# Patient Record
Sex: MALE | Race: WHITE | NOT HISPANIC OR LATINO | URBAN - METROPOLITAN AREA
[De-identification: names, ages, dates, MRNs, and addresses within clinical notes are randomized per-mention and may not be internally consistent; named-entity substitution may affect disease eponyms.]

---

## 2020-06-26 ENCOUNTER — OFFICE VISIT (OUTPATIENT)
Dept: URBAN - METROPOLITAN AREA SURGERY CENTER 25 | Facility: SURGERY CENTER | Age: 75
End: 2020-06-26

## 2020-06-26 ENCOUNTER — CLAIMS CREATED FROM THE CLAIM WINDOW (OUTPATIENT)
Dept: URBAN - METROPOLITAN AREA CLINIC 4 | Facility: CLINIC | Age: 75
End: 2020-06-26
Payer: MEDICARE

## 2020-06-26 DIAGNOSIS — D12.4 BENIGN NEOPLASM OF DESCENDING COLON: ICD-10-CM

## 2020-06-26 DIAGNOSIS — D12.8 BENIGN NEOPLASM OF RECTUM: ICD-10-CM

## 2020-06-26 PROCEDURE — 88305 TISSUE EXAM BY PATHOLOGIST: CPT | Performed by: PATHOLOGY

## 2020-07-23 ENCOUNTER — OFFICE VISIT (OUTPATIENT)
Dept: URBAN - METROPOLITAN AREA CLINIC 113 | Facility: CLINIC | Age: 75
End: 2020-07-23

## 2020-07-25 ENCOUNTER — TELEPHONE ENCOUNTER (OUTPATIENT)
Dept: URBAN - METROPOLITAN AREA CLINIC 13 | Facility: CLINIC | Age: 75
End: 2020-07-25

## 2020-07-25 RX ORDER — POLYETHYLENE GLYCOL 3350, SODIUM CHLORIDE, SODIUM BICARBONATE AND POTASSIUM CHLORIDE WITH LEMON FLAVOR 420; 11.2; 5.72; 1.48 G/4L; G/4L; G/4L; G/4L
TAKE  ML AS DIRECTED MIX CONTENTS PER DIRECTIONS, BEGIN DRINKING 1/2 SOLUTION @ 5P, COMPLETE REMAINDER OF SOLUTION 6HR PRIOR TO PROCEDURE POWDER, FOR SOLUTION ORAL
Qty: 1 | Refills: 0 | OUTPATIENT
Start: 2017-04-05 | End: 2017-04-19

## 2020-07-25 RX ORDER — POLYETHYLENE GLYCOL 3350, SODIUM CHLORIDE, SODIUM BICARBONATE AND POTASSIUM CHLORIDE WITH LEMON FLAVOR 420; 11.2; 5.72; 1.48 G/4L; G/4L; G/4L; G/4L
TAKE 1/2 GALLON AT 5:00 PM DAY BEFORE PROCEDURE, TAKE SECOND 1/2 OF GALLON 6 HRS PRIOR TO PROCEDURE POWDER, FOR SOLUTION ORAL
Qty: 1 | Refills: 0 | OUTPATIENT
Start: 2020-06-23 | End: 2020-06-26

## 2020-07-25 RX ORDER — OMEGA-3/DHA/EPA/FISH OIL 1200 MG
TAKE 1 CAPSULE DAILY CAPSULE ORAL
Refills: 0 | OUTPATIENT
End: 2017-05-31

## 2020-07-26 ENCOUNTER — TELEPHONE ENCOUNTER (OUTPATIENT)
Dept: URBAN - METROPOLITAN AREA CLINIC 13 | Facility: CLINIC | Age: 75
End: 2020-07-26

## 2020-07-26 RX ORDER — EZETIMIBE 10 MG/1
TAKE 1 TABLET AT BEDTIME TABLET ORAL
Refills: 0 | Status: ACTIVE | COMMUNITY

## 2020-07-26 RX ORDER — TERBINAFINE HYDROCHLORIDE 250 MG/1
TAKE 1 TABLET DAILY AS DIRECTED TABLET ORAL
Refills: 0 | Status: ACTIVE | COMMUNITY

## 2020-07-26 RX ORDER — HYDROCORTISONE 25 MG/G
APPLY SPARINGLY TO AFFECTED AREA(S) TWICE DAILY CREAM TOPICAL
Qty: 1 | Refills: 1 | Status: ACTIVE | COMMUNITY
Start: 2017-07-27

## 2020-07-26 RX ORDER — WHEAT DEXTRIN 3 G/4 G
TAKE 2 TSP DAILY IN EIGHT OUNCES OF ANY NON-DAIRY BEVERAGE OR MIXED WITH SOFT FOOD. DOSAGE UNKNOWN POWDER (GRAM) ORAL
Refills: 0 | Status: ACTIVE | COMMUNITY

## 2020-07-26 RX ORDER — BACILLUS COAGULANS/INULIN 1B-250 MG
TAKE 2 CAPSULE EVERY OTHER DAY 6 BILLION CFU'S CAPSULE ORAL
Refills: 0 | Status: ACTIVE | COMMUNITY

## 2020-07-26 RX ORDER — ASPIRIN 81 MG
TAKE 1 TABLET DAILY AS DIRECTED TABLET, DELAYED RELEASE (ENTERIC COATED) ORAL
Refills: 0 | Status: ACTIVE | COMMUNITY

## 2020-07-26 RX ORDER — OXYCODONE HYDROCHLORIDE 5 MG/1
TK 1 T PO  Q 4 H PRF MILD PAIN TABLET ORAL
Qty: 15 | Refills: 0 | Status: ACTIVE | COMMUNITY
Start: 2019-09-25

## 2020-07-26 RX ORDER — DOCUSATE SODIUM 100 MG
TAKE 1 TABLET EVERY OTHER DAY TABLET ORAL
Refills: 0 | Status: ACTIVE | COMMUNITY

## 2020-07-26 RX ORDER — LATANOPROST 0.005 %
INSTILL 1 DROP TWICE DAILY DROPS OPHTHALMIC (EYE)
Refills: 0 | Status: ACTIVE | COMMUNITY

## 2020-07-26 RX ORDER — DORZOLAMIDE HCL/PF 2 %
INSTILL 1 DROP INTO BOTH EYES 2 TIMES DAILY. UNKNOWN DOSAGE DROPS OPHTHALMIC (EYE)
Refills: 0 | Status: ACTIVE | COMMUNITY

## 2021-06-30 ENCOUNTER — TELEPHONE ENCOUNTER (OUTPATIENT)
Dept: URBAN - METROPOLITAN AREA CLINIC 113 | Facility: CLINIC | Age: 76
End: 2021-06-30

## 2021-07-16 ENCOUNTER — TELEPHONE ENCOUNTER (OUTPATIENT)
Dept: URBAN - METROPOLITAN AREA CLINIC 113 | Facility: CLINIC | Age: 76
End: 2021-07-16

## 2021-10-15 ENCOUNTER — TELEPHONE ENCOUNTER (OUTPATIENT)
Dept: URBAN - METROPOLITAN AREA CLINIC 113 | Facility: CLINIC | Age: 76
End: 2021-10-15

## 2023-03-30 ENCOUNTER — TELEPHONE ENCOUNTER (OUTPATIENT)
Dept: URBAN - METROPOLITAN AREA CLINIC 107 | Facility: CLINIC | Age: 78
End: 2023-03-30

## 2023-04-10 ENCOUNTER — OFFICE VISIT (OUTPATIENT)
Dept: URBAN - METROPOLITAN AREA CLINIC 107 | Facility: CLINIC | Age: 78
End: 2023-04-10
Payer: MEDICARE

## 2023-04-10 ENCOUNTER — DASHBOARD ENCOUNTERS (OUTPATIENT)
Age: 78
End: 2023-04-10

## 2023-04-10 VITALS
WEIGHT: 186 LBS | DIASTOLIC BLOOD PRESSURE: 84 MMHG | HEART RATE: 65 BPM | TEMPERATURE: 98.3 F | BODY MASS INDEX: 27.55 KG/M2 | HEIGHT: 69 IN | SYSTOLIC BLOOD PRESSURE: 140 MMHG | RESPIRATION RATE: 20 BRPM

## 2023-04-10 DIAGNOSIS — K76.0 HEPATIC STEATOSIS: ICD-10-CM

## 2023-04-10 DIAGNOSIS — R59.1 LYMPHADENOPATHY: ICD-10-CM

## 2023-04-10 DIAGNOSIS — D12.6 ADENOMATOUS COLON POLYP: ICD-10-CM

## 2023-04-10 PROCEDURE — 99214 OFFICE O/P EST MOD 30 MIN: CPT | Performed by: INTERNAL MEDICINE

## 2023-04-10 RX ORDER — WHEAT DEXTRIN 3 G/4 G
TAKE 2 TSP DAILY IN EIGHT OUNCES OF ANY NON-DAIRY BEVERAGE OR MIXED WITH SOFT FOOD. DOSAGE UNKNOWN POWDER (GRAM) ORAL
Refills: 0 | Status: ACTIVE | COMMUNITY

## 2023-04-10 RX ORDER — DORZOLAMIDE HCL/PF 2 %
INSTILL 1 DROP INTO BOTH EYES 2 TIMES DAILY. UNKNOWN DOSAGE DROPS OPHTHALMIC (EYE)
Refills: 0 | Status: ACTIVE | COMMUNITY

## 2023-04-10 RX ORDER — OXYCODONE HYDROCHLORIDE 5 MG/1
TK 1 T PO  Q 4 H PRF MILD PAIN TABLET ORAL
Qty: 15 | Refills: 0 | Status: ON HOLD | COMMUNITY
Start: 2019-09-25

## 2023-04-10 RX ORDER — DOCUSATE SODIUM 100 MG
TAKE 1 TABLET EVERY OTHER DAY TABLET ORAL
Refills: 0 | Status: ON HOLD | COMMUNITY

## 2023-04-10 RX ORDER — TERBINAFINE HYDROCHLORIDE 250 MG/1
TAKE 1 TABLET DAILY AS DIRECTED TABLET ORAL
Refills: 0 | Status: ON HOLD | COMMUNITY

## 2023-04-10 RX ORDER — LATANOPROST 0.005 %
INSTILL 1 DROP TWICE DAILY DROPS OPHTHALMIC (EYE)
Refills: 0 | Status: ON HOLD | COMMUNITY

## 2023-04-10 RX ORDER — BACILLUS COAGULANS/INULIN 1B-250 MG
TAKE 2 CAPSULE EVERY OTHER DAY 6 BILLION CFU'S CAPSULE ORAL
Refills: 0 | Status: ACTIVE | COMMUNITY

## 2023-04-10 RX ORDER — ASPIRIN 81 MG
TAKE 1 TABLET DAILY AS DIRECTED TABLET, DELAYED RELEASE (ENTERIC COATED) ORAL
Refills: 0 | Status: ACTIVE | COMMUNITY

## 2023-04-10 RX ORDER — HYDROCORTISONE 25 MG/G
APPLY SPARINGLY TO AFFECTED AREA(S) TWICE DAILY CREAM TOPICAL
Qty: 1 | Refills: 1 | Status: ON HOLD | COMMUNITY
Start: 2017-07-27

## 2023-04-10 RX ORDER — EZETIMIBE 10 MG/1
TAKE 1 TABLET AT BEDTIME TABLET ORAL
Refills: 0 | Status: ON HOLD | COMMUNITY

## 2023-04-10 NOTE — HPI-TODAY'S VISIT:
Mr. 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  He is trying to eat healthier and exercise more often.  He is following an anti inflammatory diet and drinks a little bit of olive oil daily.   He is eating more fruits and vegetables.  His grandson Jose Antonio was diagnosed with AML and a brain bleed December 2021 unfortunately passed away.  He has been under a great deal of stress since then.  Overall new GI complaints.  He did have liver enzymes with his PCP in November and stated they were slightly elevated.  He also had an ultrasound.  He had a CT at [facility] 2 weeks ago for follow-up of the lymphadenopathy and was told it was stable.  He otherwise denies abdominal pain, nausea, vomiting, change in bowel habits, blood in the stool or weight loss.     Previous abdominal ultrasound and elastography October 2021 revealed a mildly heterogeneous liver, normal size and contour, no liver masses, normal gallbladder and normal bile duct of 4 mm, normal elastography with a shear wave velocity of 1.3. Previous surveillance colonoscopy for history of adenoma with high-grade dysplasia June 2020 revealed a scar and tattoo in the proximal sigmoid with some residual polyp measuring 5 mm in size which was completely removed, an additional 3 mm sigmoid polyp which was removed, many sigmoid diverticula, otherwise normal colon and large internal hemorrhoids.  The polyps were tubular adenomas, it was recommended repeat colonoscopy in 3 years, 2023.

## 2023-05-08 ENCOUNTER — CLAIMS CREATED FROM THE CLAIM WINDOW (OUTPATIENT)
Dept: URBAN - METROPOLITAN AREA CLINIC 4 | Facility: CLINIC | Age: 78
End: 2023-05-08
Payer: MEDICARE

## 2023-05-08 ENCOUNTER — OFFICE VISIT (OUTPATIENT)
Dept: URBAN - METROPOLITAN AREA SURGERY CENTER 25 | Facility: SURGERY CENTER | Age: 78
End: 2023-05-08
Payer: MEDICARE

## 2023-05-08 DIAGNOSIS — D12.2 BENIGN NEOPLASM OF ASCENDING COLON: ICD-10-CM

## 2023-05-08 DIAGNOSIS — D12.0 ADENOMA OF CECUM: ICD-10-CM

## 2023-05-08 DIAGNOSIS — K63.89 OTHER SPECIFIED DISEASES OF INTESTINE: ICD-10-CM

## 2023-05-08 DIAGNOSIS — D12.5 BENIGN NEOPLASM OF SIGMOID COLON: ICD-10-CM

## 2023-05-08 DIAGNOSIS — D12.0 BENIGN NEOPLASM OF CECUM: ICD-10-CM

## 2023-05-08 DIAGNOSIS — Z09 CARDIOLOGY FOLLOW-UP ENCOUNTER: ICD-10-CM

## 2023-05-08 DIAGNOSIS — Z86.010 ADENOMAS PERSONAL HISTORY OF COLONIC POLYPS: ICD-10-CM

## 2023-05-08 PROCEDURE — 45385 COLONOSCOPY W/LESION REMOVAL: CPT | Performed by: INTERNAL MEDICINE

## 2023-05-08 PROCEDURE — G8907 PT DOC NO EVENTS ON DISCHARG: HCPCS | Performed by: INTERNAL MEDICINE

## 2023-05-08 PROCEDURE — 88305 TISSUE EXAM BY PATHOLOGIST: CPT | Performed by: PATHOLOGY

## 2023-05-08 RX ORDER — DORZOLAMIDE HCL/PF 2 %
INSTILL 1 DROP INTO BOTH EYES 2 TIMES DAILY. UNKNOWN DOSAGE DROPS OPHTHALMIC (EYE)
Refills: 0 | Status: ACTIVE | COMMUNITY

## 2023-05-08 RX ORDER — EZETIMIBE 10 MG/1
TAKE 1 TABLET AT BEDTIME TABLET ORAL
Refills: 0 | Status: ON HOLD | COMMUNITY

## 2023-05-08 RX ORDER — ASPIRIN 81 MG
TAKE 1 TABLET DAILY AS DIRECTED TABLET, DELAYED RELEASE (ENTERIC COATED) ORAL
Refills: 0 | Status: ACTIVE | COMMUNITY

## 2023-05-08 RX ORDER — WHEAT DEXTRIN 3 G/4 G
TAKE 2 TSP DAILY IN EIGHT OUNCES OF ANY NON-DAIRY BEVERAGE OR MIXED WITH SOFT FOOD. DOSAGE UNKNOWN POWDER (GRAM) ORAL
Refills: 0 | Status: ACTIVE | COMMUNITY

## 2023-05-08 RX ORDER — DOCUSATE SODIUM 100 MG
TAKE 1 TABLET EVERY OTHER DAY TABLET ORAL
Refills: 0 | Status: ON HOLD | COMMUNITY

## 2023-05-08 RX ORDER — LATANOPROST 0.005 %
INSTILL 1 DROP TWICE DAILY DROPS OPHTHALMIC (EYE)
Refills: 0 | Status: ON HOLD | COMMUNITY

## 2023-05-08 RX ORDER — HYDROCORTISONE 25 MG/G
APPLY SPARINGLY TO AFFECTED AREA(S) TWICE DAILY CREAM TOPICAL
Qty: 1 | Refills: 1 | Status: ON HOLD | COMMUNITY
Start: 2017-07-27

## 2023-05-08 RX ORDER — TERBINAFINE HYDROCHLORIDE 250 MG/1
TAKE 1 TABLET DAILY AS DIRECTED TABLET ORAL
Refills: 0 | Status: ON HOLD | COMMUNITY

## 2023-05-08 RX ORDER — BACILLUS COAGULANS/INULIN 1B-250 MG
TAKE 2 CAPSULE EVERY OTHER DAY 6 BILLION CFU'S CAPSULE ORAL
Refills: 0 | Status: ACTIVE | COMMUNITY

## 2023-05-08 RX ORDER — OXYCODONE HYDROCHLORIDE 5 MG/1
TK 1 T PO  Q 4 H PRF MILD PAIN TABLET ORAL
Qty: 15 | Refills: 0 | Status: ON HOLD | COMMUNITY
Start: 2019-09-25